# Patient Record
Sex: MALE | Race: WHITE | HISPANIC OR LATINO
[De-identification: names, ages, dates, MRNs, and addresses within clinical notes are randomized per-mention and may not be internally consistent; named-entity substitution may affect disease eponyms.]

---

## 2024-05-06 PROBLEM — Z00.00 ENCOUNTER FOR PREVENTIVE HEALTH EXAMINATION: Status: ACTIVE | Noted: 2024-05-06

## 2024-05-07 ENCOUNTER — APPOINTMENT (OUTPATIENT)
Dept: ENDOCRINOLOGY | Facility: CLINIC | Age: 24
End: 2024-05-07
Payer: MEDICAID

## 2024-05-07 VITALS
SYSTOLIC BLOOD PRESSURE: 108 MMHG | HEART RATE: 94 BPM | HEIGHT: 66 IN | BODY MASS INDEX: 27.64 KG/M2 | WEIGHT: 172 LBS | DIASTOLIC BLOOD PRESSURE: 82 MMHG | OXYGEN SATURATION: 98 %

## 2024-05-07 DIAGNOSIS — Z86.59 PERSONAL HISTORY OF OTHER MENTAL AND BEHAVIORAL DISORDERS: ICD-10-CM

## 2024-05-07 DIAGNOSIS — Z78.9 OTHER SPECIFIED HEALTH STATUS: ICD-10-CM

## 2024-05-07 PROCEDURE — 99204 OFFICE O/P NEW MOD 45 MIN: CPT

## 2024-05-07 NOTE — ASSESSMENT
[FreeTextEntry1] :  - Fatigue Low suspicion for underlying endocrinopathy.  Plan:  - Will check thyroid profile and gonadal axis.  - After lab results are reviewed will call pt to discuss if any further w/u is needed.   Pt refused Psychiatric evaluation for remote history bipolar diagnosis.

## 2024-05-07 NOTE — HISTORY OF PRESENT ILLNESS
[FreeTextEntry1] : 23 year old man with h/o bipolar disorder presented to [facility].   Pt is concerned about having low testosterone due to fatigue and lack of motivation. No prior T level evaluation.  Reports he has never been on TRT.   Puberty started at age 12. He went through puberty similar to his peers.  Denies use of any prescription or OTC meds.   He has difficulty maintaining erection. No h/o testicular trauma. No h/o mumps.  Currently not sexually active.  Doesn't have any children.   He is not following up with Psychiatrist. He was placed on medications in the past but admits to not taking them regularly.   Works in [employer].

## 2024-05-07 NOTE — PHYSICAL EXAM
[Healthy Appearance] : healthy appearance [No Respiratory Distress] : no respiratory distress [de-identified] : Muscular  [de-identified] : sparse facial hair

## 2024-05-27 ENCOUNTER — NON-APPOINTMENT (OUTPATIENT)
Age: 24
End: 2024-05-27

## 2024-05-27 LAB
LH SERPL-ACNC: 4.9 IU/L
T3FREE SERPL-MCNC: 2.99 PG/ML
T4 FREE SERPL-MCNC: 1.3 NG/DL
TESTOST FREE SERPL-MCNC: 17 PG/ML
TESTOST SERPL-MCNC: 670 NG/DL
TSH SERPL-ACNC: 1.6 UIU/ML

## 2024-05-28 ENCOUNTER — APPOINTMENT (OUTPATIENT)
Dept: ENDOCRINOLOGY | Facility: CLINIC | Age: 24
End: 2024-05-28
Payer: MEDICAID

## 2024-05-28 VITALS
OXYGEN SATURATION: 98 % | HEART RATE: 96 BPM | SYSTOLIC BLOOD PRESSURE: 122 MMHG | BODY MASS INDEX: 27.8 KG/M2 | WEIGHT: 173 LBS | HEIGHT: 66 IN | DIASTOLIC BLOOD PRESSURE: 80 MMHG | TEMPERATURE: 97.9 F

## 2024-05-28 DIAGNOSIS — R53.83 OTHER FATIGUE: ICD-10-CM

## 2024-05-28 PROCEDURE — 99214 OFFICE O/P EST MOD 30 MIN: CPT

## 2024-05-28 NOTE — HISTORY OF PRESENT ILLNESS
[FreeTextEntry1] : 23 year old man with h/o bipolar disorder presented for follow up.   Labs from May 2024- Normal thyroid profile with TSH 1.6, Ft4 of 1.3 and normal gonadal axis (LH 4.9 with Free T 17, total T 670).  Pt is concerned about having low testosterone due to fatigue and lack of motivation. No prior T level evaluation.  Reports he has never been on TRT.   Puberty started at age 12. He went through puberty similar to his peers.  Denies use of any prescription or OTC meds.   He has difficulty maintaining erection. No h/o testicular trauma. No h/o mumps.  Currently not sexually active.  Doesn't have any children.   He is not following up with Psychiatrist. He was placed on medications in the past but admits to not taking them regularly.   Works in [employer].

## 2024-05-28 NOTE — ASSESSMENT
[FreeTextEntry1] : - Fatigue Low suspicion for underlying endocrinopathy.  Normal thyroid profile and gonadal axis.   Plan:  Pt refused Psychiatric evaluation. He has remote history bipolar diagnosis. But he is agreeable to establish care with PCP for regular visits.

## 2024-06-03 ENCOUNTER — OFFICE (OUTPATIENT)
Dept: URBAN - METROPOLITAN AREA CLINIC 112 | Facility: CLINIC | Age: 24
Setting detail: OPHTHALMOLOGY
End: 2024-06-03
Payer: COMMERCIAL

## 2024-06-03 DIAGNOSIS — H01.00B: ICD-10-CM

## 2024-06-03 DIAGNOSIS — H01.00A: ICD-10-CM

## 2024-06-03 DIAGNOSIS — H00.15: ICD-10-CM

## 2024-06-03 PROCEDURE — 92004 COMPRE OPH EXAM NEW PT 1/>: CPT | Performed by: OPHTHALMOLOGY

## 2024-06-03 ASSESSMENT — CONFRONTATIONAL VISUAL FIELD TEST (CVF)
OD_FINDINGS: FULL
OS_FINDINGS: FULL

## 2024-06-03 ASSESSMENT — LID EXAM ASSESSMENTS
OS_BLEPHARITIS: LLL LUL T
OD_BLEPHARITIS: RLL RUL T

## 2024-06-11 ENCOUNTER — RX ONLY (RX ONLY)
Age: 24
End: 2024-06-11

## 2024-06-11 ENCOUNTER — OFFICE (OUTPATIENT)
Dept: URBAN - METROPOLITAN AREA CLINIC 112 | Facility: CLINIC | Age: 24
Setting detail: OPHTHALMOLOGY
End: 2024-06-11
Payer: COMMERCIAL

## 2024-06-11 DIAGNOSIS — H00.15: ICD-10-CM

## 2024-06-11 PROBLEM — H01.005 BLEPHARITIS; RIGHT UPPER LID, RIGHT LOWER LID, LEFT UPPER LID, LEFT LOWER LID: Status: ACTIVE | Noted: 2024-06-03

## 2024-06-11 PROBLEM — H01.001 BLEPHARITIS; RIGHT UPPER LID, RIGHT LOWER LID, LEFT UPPER LID, LEFT LOWER LID: Status: ACTIVE | Noted: 2024-06-03

## 2024-06-11 PROBLEM — H01.004 BLEPHARITIS; RIGHT UPPER LID, RIGHT LOWER LID, LEFT UPPER LID, LEFT LOWER LID: Status: ACTIVE | Noted: 2024-06-03

## 2024-06-11 PROBLEM — H01.002 BLEPHARITIS; RIGHT UPPER LID, RIGHT LOWER LID, LEFT UPPER LID, LEFT LOWER LID: Status: ACTIVE | Noted: 2024-06-03

## 2024-06-11 PROCEDURE — 92012 INTRM OPH EXAM EST PATIENT: CPT | Performed by: OPHTHALMOLOGY

## 2024-06-11 ASSESSMENT — LID EXAM ASSESSMENTS
OS_BLEPHARITIS: LLL LUL T
OD_BLEPHARITIS: RLL RUL T

## 2024-08-27 ENCOUNTER — RX ONLY (RX ONLY)
Age: 24
End: 2024-08-27

## 2024-08-27 ENCOUNTER — OFFICE (OUTPATIENT)
Dept: URBAN - METROPOLITAN AREA CLINIC 112 | Facility: CLINIC | Age: 24
Setting detail: OPHTHALMOLOGY
End: 2024-08-27
Payer: COMMERCIAL

## 2024-08-27 DIAGNOSIS — H00.15: ICD-10-CM

## 2024-08-27 PROCEDURE — 67800 REMOVE EYELID LESION: CPT | Mod: E2 | Performed by: OPHTHALMOLOGY

## 2024-08-27 PROCEDURE — 92285 EXTERNAL OCULAR PHOTOGRAPHY: CPT | Performed by: OPHTHALMOLOGY

## 2024-08-27 ASSESSMENT — LID EXAM ASSESSMENTS
OS_BLEPHARITIS: LLL LUL T
OD_BLEPHARITIS: RLL RUL T